# Patient Record
Sex: FEMALE | Race: WHITE
[De-identification: names, ages, dates, MRNs, and addresses within clinical notes are randomized per-mention and may not be internally consistent; named-entity substitution may affect disease eponyms.]

---

## 2017-02-21 ENCOUNTER — HOSPITAL ENCOUNTER (EMERGENCY)
Dept: HOSPITAL 25 - UCEAST | Age: 37
Discharge: HOME | End: 2017-02-21
Payer: COMMERCIAL

## 2017-02-21 VITALS — SYSTOLIC BLOOD PRESSURE: 137 MMHG | DIASTOLIC BLOOD PRESSURE: 89 MMHG

## 2017-02-21 DIAGNOSIS — F17.210: ICD-10-CM

## 2017-02-21 DIAGNOSIS — F32.9: ICD-10-CM

## 2017-02-21 DIAGNOSIS — F41.9: ICD-10-CM

## 2017-02-21 DIAGNOSIS — J40: Primary | ICD-10-CM

## 2017-02-21 PROCEDURE — G0463 HOSPITAL OUTPT CLINIC VISIT: HCPCS

## 2017-02-21 PROCEDURE — 99212 OFFICE O/P EST SF 10 MIN: CPT

## 2017-02-21 NOTE — UC
Respiratory Complaint HPI





- HPI Summary


HPI Summary: 





coughing non-stop. Was ill with "cold" for about 3 weeks, felt like she was 

starting to get better, then a week ago got worse. Now with fever, aches, chills

, mild ST, sinus congestion and pressure





- History of Current Complaint


Chief Complaint: UCRespiratory


Stated Complaint: COUGH FEVER SINUS ISSUE


Time Seen by Provider: 02/21/17 10:15


Hx Obtained From: Patient


Hx Last Menstrual Period: 2.5 weeks ago


Onset/Duration: Sudden Onset


Timing: Constant


Severity Initially: Mild


Severity Currently: Moderate


Character: Cough: Productive - occasional yellow phlegm, mainly cough is dry


Aggravating Factors: Exertion, Recumbent Position


Alleviating Factors: Nothing


Associated Signs And Symptoms: Positive: Fever, Chills, Pleuritic Chest Pain, 

URI, Nasal Congestion, Hoarseness, Sinus Discomfort.  Negative: Wheezing, 

Hemoptysis, Dizziness, Calf Pain





- Risk Factors


Pulmonary Embolism Risk Factors: Negative


Cardiac Risk Factors: Negative


Pseudomonas Risk Factors: Negative


Tuberculosis Risk Factors: Negative





- Allergies/Home Medications


Allergies/Adverse Reactions: 


 Allergies











Allergy/AdvReac Type Severity Reaction Status Date / Time


 


SHELLFISH Allergy  Hives Uncoded 02/21/17 10:01











Home Medications: 


 Home Medications





ALPRAZolam TAB* [Xanax TAB*] 1 tab PO PRN 02/21/17 [History]


Aleve    PRN 02/21/17 [History]


Amphetamine MIXED SALT TAB* [Adderall TAB*] 10 mg PO BID 02/21/17 [History 

Confirmed 02/21/17]


Ibuprofen TAB* [Advil TAB*] 400 mg PO PRN 02/21/17 [History]


Mucinex    02/21/17 [History]


Sertraline* [Zoloft*] 1 tab PO BEDTIME 02/21/17 [History Confirmed 02/21/17]


traZODone TAB* [Desyrel TAB*] 1 tab PO BEDTIME 02/21/17 [History Confirmed 02/21 /17]











PMH/Surg Hx/FS Hx/Imm Hx


Respiratory History Of: Reports: Asthma - as a child


Psychological History Of: Reports: Anxiety, Depression





- Surgical History


Surgical History: None





- Family History


Known Family History: Positive: Cardiac Disease - mother


Family History: FHx of [place] - maternal





- Social History


Occupation: Employed Full-time


Lives: With Family


Alcohol Use: Weekly


Alcohol Amount: couple times a week


Substance Use Type: None


Smoking Status (MU): Light Every Day Tobacco Smoker


Amount Used/How Often: 2-3 cigs per day


Length of Time of Smoking/Using Tobacco: 22 years


Household Exposure Type: Cigarettes





Review of Systems


Constitutional: Negative


Skin: Negative


Eyes: Negative


ENT: Sore Throat, Nasal Discharge


Respiratory: Cough


Cardiovascular: Negative


Gastrointestinal: Negative


Genitourinary: Negative


Motor: Negative


Neurovascular: Negative


Musculoskeletal: Myalgia


Neurological: Headache


Psychological: Negative


All Other Systems Reviewed And Are Negative: Yes





Physical Exam


Triage Information Reviewed: Yes


Appearance: Well-Appearing, No Pain Distress, Well-Nourished


Vital Signs: 


 Initial Vital Signs











Temp  98.3 F   02/21/17 09:52


 


Pulse  96   02/21/17 09:52


 


Resp  18   02/21/17 09:52


 


BP  137/89   02/21/17 09:52


 


Pulse Ox  98   02/21/17 09:52











Vital Signs Reviewed: Yes


Eye Exam: Normal


ENT: Positive: Pharynx normal, Nasal congestion, Nasal drainage, TMs normal, 

Muffled/hoarse voice - hoarse


Neck exam: Normal


Respiratory Exam: Normal


Respiratory: Positive: Lungs clear, Normal breath sounds, No respiratory 

distress, No accessory muscle use, Other: - very frequent harsh, dry cough. Can'

t take a breath without coughing


Cardiovascular Exam: Normal


Musculoskeletal Exam: Normal


Neurological Exam: Normal


Psychological Exam: Normal


Skin Exam: Normal





UC Diagnostic Evaluation





- Laboratory


O2 Sat by Pulse Oximetry: 98





Respiratory Course/Dx





- Differential Dx/Diagnosis


Differential Diagnosis/HQI/PQRI: Bronchitis, Lower Resp Infection, Sinusitis


Provider Diagnoses: bronchitis





Discharge





- Discharge Plan


Condition: Stable


Disposition: HOME


Prescriptions: 


Albuterol HFA INHALER* [Ventolin HFA Inhaler*] 1 - 2 puff INH Q4H PRN #1 mdi


 PRN Reason: cough, wheezing


Clarithromycin TAB* [Biaxin TAB*] 500 mg PO BID #20 tab


Hydrocodone W/ Homatropine [Hydromet 5-1.5 mg/5Ml] 1 teasp PO TID PRN #100 ml 

MDD 15ml


 PRN Reason: Cough


Patient Education Materials:  Acute Bronchitis (ED)


Forms:  *Work Release


Referrals: 


No Primary Care Phys,NOPCP [Primary Care Provider] -

## 2017-07-26 ENCOUNTER — HOSPITAL ENCOUNTER (EMERGENCY)
Dept: HOSPITAL 25 - UCEAST | Age: 37
Discharge: HOME | End: 2017-07-26
Payer: MEDICAID

## 2017-07-26 VITALS — DIASTOLIC BLOOD PRESSURE: 82 MMHG | SYSTOLIC BLOOD PRESSURE: 122 MMHG

## 2017-07-26 DIAGNOSIS — L03.113: Primary | ICD-10-CM

## 2017-07-26 PROCEDURE — G0463 HOSPITAL OUTPT CLINIC VISIT: HCPCS

## 2017-07-26 PROCEDURE — 99212 OFFICE O/P EST SF 10 MIN: CPT

## 2017-07-26 NOTE — UC
reanna CURTIS Timothy, scribed for Jaycee Diaz MD on 07/26/17 at 0803 .





Skin Complaint HPI





- HPI Summary


HPI Summary: 





Nelida Marin is a 36 yo female presenting to [facility] with rash on her upper 

RLE, first noted 7/20/17, getting larger and pruritic, causing 3/10 pain, 

concerned for tick bite. She states that she has been in tick-infested areas 

recently. Her  is present in room. She states that the area has gotten 

slightly larger over the past week. She denies any N/V, fevers, chills, or 

other rashes. She states she has been bruising easily in the past 2 weeks. She 

denies any self-medication. Her MHx includes asthma, depression, anxiety, 

tobacco use. Pt medication list reviewed this visit.





- History of Current Complaint


Time Seen by Provider: 07/26/17 08:04


Stated Complaint: TICK BITE


Hx Obtained From: Patient


Hx Last Menstrual Period: 6/17/17


Onset/Duration: Sudden Onset, Lasting Days, Still Present


Skin Exposure Onset/Duration: Days Ago


Timing: Constant


Onset Severity: Moderate


Current Severity: Moderate


Pain Intensity: 3


Pain Scale Used: 0-10 Numeric


Location: Other - upper RLE


Character: Pruritus, Pain


Associated Signs & Symptoms: Positive: Rash.  Negative: Nausea, Vomiting, Fever

, Chills





- Allergy/Home Medications


Allergies/Adverse Reactions: 


 Allergies











Allergy/AdvReac Type Severity Reaction Status Date / Time


 


SHELLFISH Allergy  Hives Uncoded 02/21/17 10:01














Review of Systems


Constitutional: Negative


Skin: Other - tick bite upper RLE


Eyes: Negative


ENT: Negative


Respiratory: Negative


Cardiovascular: Negative


Gastrointestinal: Negative


Genitourinary: Negative


Motor: Negative


Neurovascular: Negative


Musculoskeletal: Negative


Neurological: Negative


Psychological: Negative


All Other Systems Reviewed And Are Negative: Yes





PMH/Surg Hx/FS Hx/Imm Hx


Respiratory History: Asthma


Psychological History: Anxiety, Depression





- Surgical History


Surgical History: None





- Family History


Known Family History: Positive: Cardiac Disease - mother, Hypertension, Other - 

psoriatic arthritis, celiac disease


Family History: FHx of [place] - maternal





- Social History


Alcohol Use: Weekly


Alcohol Amount: couple times a week


Substance Use Type: None


Smoking Status (MU): Light Every Day Tobacco Smoker


Amount Used/How Often: 5-10 cig per day


Length of Time of Smoking/Using Tobacco: 22 years


Household Exposure Type: Cigarettes





Physical Exam


Triage Information Reviewed: Yes


Vital Signs: 


 Initial Vital Signs











Temp  97.9 F   07/26/17 07:28


 


Pulse  78   07/26/17 07:28


 


Resp  18   07/26/17 07:28


 


BP  122/82   07/26/17 07:28


 


Pulse Ox  100   07/26/17 07:28











Vital Signs Reviewed: Yes





Course/Dx





- Course


Course Of Treatment: Nelida Marin is a 36 yo female presenting to [facility] 

with rash on her upper RLE, first noted 7/20/17, getting larger and pruritic, 

causing 3/10 pain, concerned for tick bite. Pt medication list reviewed this 

visit. Pt was advised as to differentials and possible courses of Tx. Pt 

requests diflucan with any ABx Rx. She was advised to use hydrocortisone on the 

afflicted area. After clinical examination, she will be discharged home with 

RLE cellulitis with appropriate instructions and follow up.





- Differential Diagnoses - Skin Complaint


Differential Diagnoses: Cellulitis, Local Allergic Reaction, Tick Born Illness





- Diagnoses


Provider Diagnoses: cellulitis RLE





Discharge





- Discharge Plan


Condition: Stable


Disposition: HOME


Patient Education Materials:  Cellulitis (ED)


Referrals: 


[facility] [Outside] - If Needed


Additional Instructions: 


Please follow up with the primary care physician provided regarding your visit 

to urgent care today. Return to urgent care or the emergency department with 

any new or recurring symptoms.





The documentation as recorded by the reanna man Timothy accurately 

reflects the service I personally performed and the decisions made by me, 

Jaycee Diaz MD.

## 2018-05-25 ENCOUNTER — HOSPITAL ENCOUNTER (EMERGENCY)
Dept: HOSPITAL 25 - UCEAST | Age: 38
Discharge: HOME | End: 2018-05-25
Payer: COMMERCIAL

## 2018-05-25 VITALS — DIASTOLIC BLOOD PRESSURE: 84 MMHG | SYSTOLIC BLOOD PRESSURE: 122 MMHG

## 2018-05-25 DIAGNOSIS — Z91.013: ICD-10-CM

## 2018-05-25 DIAGNOSIS — F17.210: ICD-10-CM
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
## 2018-05-25 DIAGNOSIS — S91.112A: Primary | ICD-10-CM
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
## 2018-05-25 PROCEDURE — 90715 TDAP VACCINE 7 YRS/> IM: CPT

## 2018-05-25 PROCEDURE — 12001 RPR S/N/AX/GEN/TRNK 2.5CM/<: CPT

## 2018-05-25 PROCEDURE — 99211 OFF/OP EST MAY X REQ PHY/QHP: CPT

## 2018-05-25 PROCEDURE — G0463 HOSPITAL OUTPT CLINIC VISIT: HCPCS

## 2018-05-25 PROCEDURE — 90471 IMMUNIZATION ADMIN: CPT

## 2018-05-25 NOTE — ED
Laceration/Wound HPI





- HPI Summary


HPI Summary: 





Patient is a 37-year-old female presenting to the  with laceration to the 

plantar surface of the left great toe.  She sustained an injury after stepping 

on glass last evening.  Bleeding was well controlled PTA.  She was able to wash 

the area well, wrap with gauze.  Tetanus is not up-to-date.  She denies any 

blood thinners.  Denies any redness or warmth to the area.  She states she has 

been otherwise well.  Denies any other injuries.





- History of Current Complaint


Stated Complaint: TOE LAC


Time Seen by Provider: 05/25/18 13:04


Hx Obtained From: Patient


Hx Last Menstrual Period: 4/8/18


Mechanism of Injury: Sharp/Blunt Trauma


Onset/Duration: Sudden Onset


Aggravating: Movement


Alleviating: Compression


Timing: Constant


Onset Severity: Mild


Current Severity: Mild


Pain Intensity: 5


Pain Scale Used: 0-10 Numeric


Associated Signs & Symptoms: Negative


Related Hx: Dominant Hand (Right)





- Allergy/Home Medications


Allergies/Adverse Reactions: 


 Allergies











Allergy/AdvReac Type Severity Reaction Status Date / Time


 


SHELLFISH Allergy  Hives Uncoded 05/25/18 12:44














PMH/Surg Hx/FS Hx/Imm Hx


Previously Healthy: Yes


Respiratory History: Reports: Hx Asthma - as a child


Psychiatric History: Reports: Hx Anxiety, Hx Depression





- Immunization History


Hx Pertussis Vaccination: No


Immunizations Up to Date: Unable to Obtain/Confirm


Infectious Disease History: Yes


Infectious Disease History: Reports: Hx Shingles


   Denies: History Other Infectious Disease, Traveled Outside the [place] in Last 30 

Days





- Family History


Known Family History: Positive: Cardiac Disease - mother, Hypertension, Other - 

psoriatic arthritis, celiac disease  with MRSA


Family History: FHx of [place] - maternal





- Social History


Occupation: Employed Full-time


Lives: With Family


Alcohol Use: Weekly


Alcohol Amount: couple times a week


Hx Substance Use: No


Substance Use Type: Reports: None


Hx Tobacco Use: Yes


Smoking Status (MU): Light Every Day Tobacco Smoker


Amount Used/How Often: 5-10 cig per day


Length of Time of Smoking/Using Tobacco: 22 years





Review of Systems


Constitutional: Negative


Negative: Fever, Chills, Fatigue, Skin Diaphoresis


ENT: Negative


Cardiovascular: Negative


Respiratory: Negative


Negative: Arthralgia, Myalgia


Positive: Other - laceration to the plantar surface of the left great toe


Neurological: Negative


Psychological: Normal


All Other Systems Reviewed And Are Negative: Yes





Physical Exam


Triage Information Reviewed: Yes


Vital Signs On Initial Exam: 


 Initial Vitals











Temp Pulse Resp BP Pulse Ox


 


 98.7 F   85   18   122/84   100 


 


 05/25/18 12:46  05/25/18 12:46  05/25/18 12:46  05/25/18 12:46  05/25/18 12:46











Vital Signs Reviewed: Yes


Appearance: Positive: Well-Appearing, No Pain Distress, Well-Nourished


Skin: Positive: Warm, Skin Color Reflects Adequate Perfusion, Other - laceration


Head/Face: Positive: Normal Head/Face Inspection


Eyes: Positive: EOMI, LAISHA


Neck: Positive: Supple, No Lymphadenopathy


Respiratory/Lung Sounds: Positive: Clear to Auscultation, Breath Sounds Present


Cardiovascular: Positive: RRR


Musculoskeletal: Positive: Normal, Strength/ROM Intact


Neurological: Positive: Speech Normal


Psychiatric: Positive: Normal, Affect/Mood Appropriate


AVPU Assessment: Alert





Diagnostics





- Vital Signs


 Vital Signs











  Temp Pulse Resp BP Pulse Ox


 


 05/25/18 12:46  98.7 F  85  18  122/84  100














- Laboratory


Lab Statement: Any lab studies that have been ordered have been reviewed, and 

results considered in the medical decision making process.





Laceration Repair Course/Dx





- Course


Course Of Treatment: During the course, the patient is evaluated for laceration 

of the left great toe.  On examination, there is a 1.7 cm vertical laceration 

of the left plantar surface of the great toe.  Depth appears to be 0.2 cm.  The 

edges are well approximated.  Bleeding is well-controlled PTA.  This does not 

appear to be requiring sutures for repair.  Adhesive applied.  Steri-Strips 

applied.  Gauze wrapped.  Tetanus updated.





- Differential Dx


Differental Diagnoses: Joint Infection, Laceration, Puncture Wound, Tendon 

Laceration





- Clinical Impression


Provider Diagnoses: 


 Laceration








Discharge





- Sign-Out/Discharge


Documenting (check all that apply): Discharge/Admit/Transfer





- Discharge Plan


Condition: Stable


Disposition: HOME


Patient Education Materials:  Laceration (ED)


Referrals: 


Daniele Alexander MD [Primary Care Provider] - 


Additional Instructions: 


Keep the area covered x 24 hours


Then you may take off the outside bandage but keep steri strips applied


You may then get wet but do not scrub the area


Steri strips - keep applied x 5 days - you may add more as needed


Tetanus was updated





- Billing Disposition and Condition


Condition: STABLE


Disposition: HOME

## 2018-05-28 ENCOUNTER — HOSPITAL ENCOUNTER (EMERGENCY)
Dept: HOSPITAL 25 - UCEAST | Age: 38
Discharge: HOME | End: 2018-05-28
Payer: COMMERCIAL

## 2018-05-28 VITALS — SYSTOLIC BLOOD PRESSURE: 141 MMHG | DIASTOLIC BLOOD PRESSURE: 98 MMHG
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
## 2018-05-28 DIAGNOSIS — R03.0: ICD-10-CM

## 2018-05-28 DIAGNOSIS — L08.9: ICD-10-CM

## 2018-05-28 DIAGNOSIS — S91.112D: Primary | ICD-10-CM

## 2018-05-28 DIAGNOSIS — F17.210: ICD-10-CM

## 2018-05-28 PROCEDURE — 99212 OFFICE O/P EST SF 10 MIN: CPT

## 2018-05-28 PROCEDURE — G0463 HOSPITAL OUTPT CLINIC VISIT: HCPCS

## 2018-05-28 PROCEDURE — 12001 RPR S/N/AX/GEN/TRNK 2.5CM/<: CPT

## 2018-05-28 NOTE — UC
Laceration HPI





- HPI Summary


HPI Summary: 





38 y/o female presents to the urgent care c/o laceration on left great toe s/p 

stepping on a  glass. Laceration is in the plantar surface of the left great 

toe and was  treated two days ago here at the clinic w/ skin adhesive and steri-

strips.  Yesterday steri-strips  came off when she  wet her feet yesterday.  

Today she has mild redness around the wound. Pain is dull 3/10. Pt denies fever

, however her Temp is usually low and this morning tem was  99F. Pt denies 

numbness or tingling sensation over the toe, foot pain, calf pain, SOB, chest 

pain, abdominal pain, N/V/D. Pt was given Tdap at the clinic 2 days ago. 





- History Of Current Complaint


Chief Complaint: UCLowerExtremity


Stated Complaint: Cut on toe


Time Seen by Provider: 05/28/18 15:09


Hx Obtained From: Patient


Hx Last Menstrual Period: 4/8/18


Laceration Location: Foot - left great toe laceration


Mechanism Of Injury: Sharp Trauma


Onset/Duration: Sudden Onset, Lasting Days - 3 days, Still Present, Worse Since 

- yesterday


Severity: Mild


Pain Intensity: 3


Pain Scale Used: 0-10 Numeric


Aggravating Factors: Movement


Related History: Dominant Hand Right





- Allergies/Home Medications


Allergies/Adverse Reactions: 


 Allergies











Allergy/AdvReac Type Severity Reaction Status Date / Time


 


SHELLFISH Allergy  Hives Uncoded 05/28/18 15:10











Home Medications: 


 Home Medications





Ibuprofen TAB* [Advil TAB*] 200 mg PO Q6H PRN 05/28/18 [History Confirmed 05/28/ 18]











PMH/Surg Hx/FS Hx/Imm Hx


Previously Healthy: Yes


Other Endocrine History: shingles


Respiratory History: Asthma


Psychological History: Anxiety, Depression





- Surgical History


Surgical History: None





- Family History


Known Family History: Positive: Cardiac Disease - mother, Hypertension, Other - 

psoriatic arthritis, celiac disease  with MRSA


Family History: FHx of [place] - maternal





- Social History


Occupation: Employed Full-time


Lives: With Family


Alcohol Use: Weekly


Alcohol Amount: couple times a week


Substance Use Type: None


Smoking Status (MU): Light Every Day Tobacco Smoker


Amount Used/How Often: 5-10 cig per day


Length of Time of Smoking/Using Tobacco: 22 years


Household Exposure Type: Cigarettes





Review of Systems


Constitutional: Negative


Skin: Other - left great toe laceration w/ mild swelling


Eyes: Negative


ENT: Negative


Respiratory: Negative


Cardiovascular: Negative


Gastrointestinal: Negative


Genitourinary: Negative


Motor: Negative


Neurovascular: Negative


Musculoskeletal: Other: - left great toe pain s/p laceration


Neurological: Negative


Psychological: Negative


Is Patient Immunocompromised?: No


All Other Systems Reviewed And Are Negative: Yes





Physical Exam





- Summary


Physical Exam Summary: 





Vital Signs Reviewed: Yes


General: well developed, well nourished female sitting in the examining table w/

o any apparent distress


Eye Exam: Normal


Eyes: Positive: Conjunctiva Clear - PERRLA, EOMI, fundi grossly normal


ENT: Positive: Normal ENT inspection, Hearing grossly normal, Pharynx normal, 

TMs normal


Neck: Positive: Supple, Nontender, No Lymphadenopathy


Respiratory: Positive: Chest non-tender, Lungs clear, Normal breath sounds, No 

respiratory distress


Cardiovascular: Positive: RRR, No Murmur, Pulses Normal, Brisk Capillary Refill


Abdomen Description: Positive: Nontender, No Organomegaly, Soft. Negative: CVA 

Tenderness (R), CVA Tenderness (L)


Bowel Sounds: Positive: Present


Musculoskeletal: Positive: Strength Intact, ROM Intact, No Edema


Neurological: Positive: Alert, Muscle Tone Normal


Psychological Exam: Normal


Skin: Positive: a superficial linear laceration on plantar aspect of the left 

great toe about 1.7cm w/ surrounding erythema and warm to touch, tender to 

palpation w/ mild swelling.


no foreign body observed. FROM of left toe and left foot, sensation intact, 

capillary refill brisk, and pulses WNL.








Triage Information Reviewed: Yes


Vital Signs: 


 Initial Vital Signs











Temp  98.1 F   05/28/18 15:04


 


Pulse  107   05/28/18 15:04


 


Resp  16   05/28/18 15:04


 


BP  141/98   05/28/18 15:04


 


Pulse Ox  98   05/28/18 15:04














Laceration Repair





- Laceration Repair


  ** 1


Description: Linear


Laceration Size After Repair: Length (cm) - 1.7


Modified For Repair: No


Cleansing Completed Via Routine Prep: Yes


Irrigation With Pressure Irrigation Device: Yes


Closure Material: SteriStrips - 5


Suture Of: Skin





Laceration Course/Dx





- Course/Dx


Course Of Treatment: 38 y/o female presents to the urgent care c/o laceration 

on left great toe s/p stepping on a  glass. Laceration is in the plantar 

surface of the left great toe and was  treated two days ago here at the clinic w

/ skin adhesive and steri-strips.  Yesterday steri-strips  came off when she  

wet her feet yesterday.  Today she has mild redness around the wound. Pain is 

dull 3/10. Pt denies fever, however her Temp is usually low and this morning 

tem was  99F. Pt denies numbness or tingling sensation over the toe, foot pain, 

calf pain, SOB, chest pain, abdominal pain, N/V/D. Pt was given Tdap at the 

clinic 2 days ago.Hx obtained. Pt w/ a superficial laceration on plantar aspect 

of the left great toe about 1.7cm w/ surrounding erythema and warm to touch, 

tender to palpation w/ mild swelling.Wound cleaned and Bacitracin ointment 

applied. laceration closed w/ steristrips, and sterile dressing applied. Pt Rx 

Keflex PO and Bacitracin oint.  Fluconazole Po as per Pt request since Pt 

ususally develops yeast infection after ABx Tx.  Pt's BP is elevated today 

advised to decrease salt in diet, monitor BP and f/u with PCP for further 

management.  D/C instructions explained.  Pt understood and agreed w/ plan of 

care.





- Differential Dx - Laceration/Wound


Differental Diagnoses: Abrasion, Cellulitis, Dehiscence, Healing Wound, 

Laceration, Puncture Wound


Provider Diagnoses: 1- Left first toe wound infection s/p laceration.  2- 

elevated BP w/o Hx of HTN





Discharge





- Sign-Out/Discharge


Documenting (check all that apply): Discharge/Admit/Transfer - D/c home





- Discharge Plan


Condition: Stable


Disposition: HOME


Prescriptions: 


Bacitracin OINTMENT* 1 applic TOPICAL BID #1 tube


Cephalexin CAP* [Keflex CAP*] 500 mg PO QID #28 cap


Fluconazole [Fluconazole 150 mg tab] 150 mg PO ONCE #1 tablet


Patient Education Materials:  Wound Infection (DC), Low-Sodium Diet (ED)


Referrals: 


Daniele Alexander MD [Primary Care Provider] - 3 Days


Additional Instructions: 


1-Please take full course of antibiotic to avoid resistance.


2- Keep wound clean and dry and avoid excessive movement w/ your toe or 

standing for long periods of time


3- Apply Bacitracin oint as directed . 


4-Take Ibuprofen or Tylenol PO q6-8hrs prn for pain or swelling. 


5- If you develop fever or redness around your finger despite the antibiotic 

please go to the ER immediately or return to the Urgent care.


6-Your BP is elevated today. please decrease salt in your diet, monitor BP and 

if it continues to be elevated please f/u with your PCP for further management























- Billing Disposition and Condition


Condition: STABLE


Disposition: HOME